# Patient Record
Sex: FEMALE | Race: WHITE | NOT HISPANIC OR LATINO | Employment: FULL TIME | URBAN - METROPOLITAN AREA
[De-identification: names, ages, dates, MRNs, and addresses within clinical notes are randomized per-mention and may not be internally consistent; named-entity substitution may affect disease eponyms.]

---

## 2023-07-21 ENCOUNTER — OFFICE VISIT (OUTPATIENT)
Dept: PRIMARY CARE | Facility: CLINIC | Age: 68
End: 2023-07-21
Payer: COMMERCIAL

## 2023-07-21 VITALS
HEART RATE: 80 BPM | TEMPERATURE: 97.6 F | OXYGEN SATURATION: 95 % | SYSTOLIC BLOOD PRESSURE: 116 MMHG | BODY MASS INDEX: 39.49 KG/M2 | HEIGHT: 65 IN | DIASTOLIC BLOOD PRESSURE: 62 MMHG | WEIGHT: 237 LBS | RESPIRATION RATE: 18 BRPM
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
## 2023-07-21 DIAGNOSIS — S70.02XA CONTUSION OF LEFT HIP, INITIAL ENCOUNTER: Primary | ICD-10-CM

## 2023-07-21 DIAGNOSIS — S60.212A CONTUSION OF LEFT WRIST, INITIAL ENCOUNTER: ICD-10-CM

## 2023-07-21 PROCEDURE — 99213 OFFICE O/P EST LOW 20 MIN: CPT

## 2023-07-21 RX ORDER — LORATADINE 10 MG/1
10 TABLET ORAL
COMMUNITY
Start: 2022-04-04

## 2023-07-21 RX ORDER — IBUPROFEN 100 MG/5ML
1 SUSPENSION, ORAL (FINAL DOSE FORM) ORAL
COMMUNITY

## 2023-07-21 RX ORDER — ACETAMINOPHEN 500 MG
2000 TABLET ORAL
Qty: 30 CAPSULE | Refills: 11 | COMMUNITY
Start: 2023-03-10 | End: 2024-03-09

## 2023-07-21 RX ORDER — CHOLECALCIFEROL (VITAMIN D3) 125 MCG
3000 CAPSULE ORAL
COMMUNITY
Start: 2014-09-23

## 2023-07-21 RX ORDER — LANOLIN ALCOHOL/MO/W.PET/CERES
1000 CREAM (GRAM) TOPICAL DAILY
COMMUNITY
Start: 2023-07-06

## 2023-07-21 RX ORDER — LEVOTHYROXINE SODIUM 175 UG/1
1 TABLET ORAL DAILY
COMMUNITY
Start: 2012-08-02

## 2023-07-21 RX ORDER — MELOXICAM 15 MG/1
1 TABLET ORAL DAILY
COMMUNITY
Start: 2012-04-09

## 2023-07-21 RX ORDER — ATORVASTATIN CALCIUM 40 MG/1
1 TABLET, FILM COATED ORAL
COMMUNITY
Start: 2022-11-17

## 2023-07-21 ASSESSMENT — ENCOUNTER SYMPTOMS
MYALGIAS: 1
BACK PAIN: 0
SHORTNESS OF BREATH: 0
DIZZINESS: 0
NECK STIFFNESS: 0
NAUSEA: 0
JOINT SWELLING: 0
ARTHRALGIAS: 1
FEVER: 0
LIGHT-HEADEDNESS: 0
VOMITING: 0
NECK PAIN: 0

## 2023-07-21 NOTE — PROGRESS NOTES
I reviewed with the resident the medical history and the resident’s findings on physical examination.  I discussed with the resident the patient’s diagnosis and concur with the treatment plan as documented in the resident note.     Tyler Kumar MD

## 2023-07-21 NOTE — PROGRESS NOTES
"Subjective   Yohana Miguel is a 68 y.o. female who is here for [facility] visit after a fall at work on 23. She was bending over to put something away in a cooler and when she stood up and turned around she \"tripped over her feet and fell\", landing on her left outstretched hand and left hip. Denies hitting head or losing consciousness. Went to ED following fall for workup. ED workup included Xrays of hip and wrist/hand which were negative, she was sent home with motrin and tylenol. No head imaging or lab-work performed.  Today her pain is mostly resolved and is being well controlled on meloxicam and Tylenol.  She does have some bruising on her left hip, however her gait has not been affected by the injury.  Her wrist and hand are feeling much better and she is able to perform daily tasks and work duties.    She works at [employer] in the [facility] and part of her job includes unloading the truck, preparing food, and working cash register.     Comprehensive Medical/Surgical/Social/Family History  Past Medical History:   Diagnosis Date    Other conditions influencing health status     Congenital Malformations    Personal history of other diseases of the circulatory system     History of varicose veins    Personal history of other diseases of the nervous system and sense organs     History of sleep apnea    Personal history of other endocrine, nutritional and metabolic disease     History of obesity    Personal history of other endocrine, nutritional and metabolic disease     History of hypothyroidism    Personal history of other endocrine, nutritional and metabolic disease     History of hyperlipidemia    Plantar fascial fibromatosis     Plantar fasciitis    Vitamin D deficiency, unspecified     Vitamin D deficiency     Past Surgical History:   Procedure Laterality Date    CARPAL TUNNEL RELEASE  2012    Neuroplasty Decompression Median Nerve At Carpal Tunnel     SECTION, CLASSIC  2012    " " Section    GALLBLADDER SURGERY  2012    Gallbladder Surgery     Social History     Social History Narrative    Not on file         Allergies and Medications  Amoxicillin-pot clavulanate and Erythromycin  Current Outpatient Medications on File Prior to Visit   Medication Sig Dispense Refill    ascorbic acid (Vitamin C) 1,000 mg tablet Take 1 tablet (1,000 mg) by mouth once daily.      atorvastatin (Lipitor) 40 mg tablet Take 1 tablet (40 mg) by mouth once daily in the evening. Take with meals.      cholecalciferol (Vitamin D-3) 50 mcg (2,000 unit) capsule Take 1 capsule (2,000 Units) by mouth once daily. 30 capsule 11    cyanocobalamin (Vitamin B-12) 1,000 mcg tablet Take 1 tablet (1,000 mcg) by mouth once daily.      lactase (Lactaid) 3,000 unit tablet Take 1 tablet (3,000 Units) by mouth.      levothyroxine (Synthroid, Levoxyl) 175 mcg tablet Take 1 tablet (175 mcg) by mouth once daily.      loratadine (Claritin) 10 mg tablet Take 1 tablet (10 mg) by mouth once daily.      meloxicam (Mobic) 15 mg tablet Take 1 tablet (15 mg) by mouth once daily.       No current facility-administered medications on file prior to visit.       Review of Systems   Constitutional:  Negative for fever.   Respiratory:  Negative for shortness of breath.    Cardiovascular:  Negative for chest pain.   Gastrointestinal:  Negative for nausea and vomiting.   Musculoskeletal:  Positive for arthralgias and myalgias. Negative for back pain, gait problem, joint swelling, neck pain and neck stiffness.   Neurological:  Negative for dizziness and light-headedness.   All other systems reviewed and are negative.      Objective   /62 (BP Location: Right arm, Patient Position: Sitting)   Pulse 80   Temp 36.4 °C (97.6 °F)   Resp 18   Ht 1.651 m (5' 5\")   Wt 108 kg (237 lb)   SpO2 95%   BMI 39.44 kg/m²     Physical Exam  Vitals reviewed.   Constitutional:       General: She is not in acute distress.     Appearance: Normal " appearance. She is not toxic-appearing.   HENT:      Head: Normocephalic and atraumatic.      Nose: Nose normal.   Eyes:      Extraocular Movements: Extraocular movements intact.   Cardiovascular:      Rate and Rhythm: Normal rate and regular rhythm.      Heart sounds: No murmur heard.     No friction rub. No gallop.   Pulmonary:      Effort: Pulmonary effort is normal. No respiratory distress.      Breath sounds: Normal breath sounds. No wheezing, rhonchi or rales.   Musculoskeletal:         General: Swelling and tenderness present. Normal range of motion.      Right upper leg: Normal.      Left upper leg: Swelling and tenderness present.      Comments: Left hip tenderness, bruising, swelling in the area where patient landed during fall.  Good range of motion laterally, normal strength, normal sensation, no bony deformities.   Skin:     General: Skin is warm and dry.   Neurological:      General: No focal deficit present.      Mental Status: She is alert.   Psychiatric:         Mood and Affect: Mood normal.         Behavior: Behavior normal.         Assessment/Plan 68-year-old female presenting for [facility] visit following fall at work when she landed on her left hip and hand/wrist.  She is doing much better since the initial fall and pain is being well controlled with meloxicam 15 mg daily as well as Tylenol as needed.  X-rays from ED visit on 7/17/2023 revealed negative x-rays of the wrist and left hip.  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  She is more appropriate to have reduced duties for the next 2 weeks which were outlined in [facility] paperwork.  Referral to physical therapy also provided to help strengthen and provide balance training to prevent further falls.  Problem List Items Addressed This Visit       Contusion of left wrist    Relevant Orders    Referral to Physical Therapy    Contusion of left hip - Primary    Relevant Orders    Referral to  Physical Therapy    Fall    Relevant Orders    Referral to Physical Therapy

## 2023-08-03 ENCOUNTER — OFFICE VISIT (OUTPATIENT)
Dept: PRIMARY CARE | Facility: CLINIC | Age: 68
End: 2023-08-03
Payer: COMMERCIAL

## 2023-08-03 VITALS
HEART RATE: 78 BPM | OXYGEN SATURATION: 96 % | SYSTOLIC BLOOD PRESSURE: 128 MMHG | BODY MASS INDEX: 38.94 KG/M2 | DIASTOLIC BLOOD PRESSURE: 75 MMHG | RESPIRATION RATE: 18 BRPM | TEMPERATURE: 97.2 F | WEIGHT: 234 LBS

## 2023-08-03 DIAGNOSIS — S60.212D CONTUSION OF LEFT WRIST, SUBSEQUENT ENCOUNTER: ICD-10-CM
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
## 2023-08-03 DIAGNOSIS — S70.02XD CONTUSION OF LEFT HIP, SUBSEQUENT ENCOUNTER: Primary | ICD-10-CM

## 2023-08-03 PROCEDURE — 99213 OFFICE O/P EST LOW 20 MIN: CPT

## 2023-08-03 NOTE — PROGRESS NOTES
I reviewed with the resident the medical history and the resident’s findings on physical examination.  I discussed with the resident the patient’s diagnosis and concur with the treatment plan as documented in the resident note.     No Mahajan MD

## 2023-08-03 NOTE — PROGRESS NOTES
Subjective   Yohana Miguel is a 68 y.o. female who is here for follow up on left hip contusion. She is doing much better since the initial fall and pain is being well controlled with meloxicam 15 mg daily as well as Tylenol as needed. Prior X-rays from ED visit on 2023 revealed negative x-rays of the wrist and left hip. She was referred to PT and instructed to limit lifting heavy items at work. Pt will start PT 23 and has been lifting occasionally on the job. She said that her bruises and pain have gotten better. She has been doing [employer] for exercise.     Review of Systems  MSK: Negative for myalgia, arthralgia, and joint swelling.    Comprehensive Medical/Surgical/Social/Family History  Past Medical History:   Diagnosis Date    Other conditions influencing health status     Congenital Malformations    Personal history of other diseases of the circulatory system     History of varicose veins    Personal history of other diseases of the nervous system and sense organs     History of sleep apnea    Personal history of other endocrine, nutritional and metabolic disease     History of obesity    Personal history of other endocrine, nutritional and metabolic disease     History of hypothyroidism    Personal history of other endocrine, nutritional and metabolic disease     History of hyperlipidemia    Plantar fascial fibromatosis     Plantar fasciitis    Vitamin D deficiency, unspecified     Vitamin D deficiency     Past Surgical History:   Procedure Laterality Date    CARPAL TUNNEL RELEASE  2012    Neuroplasty Decompression Median Nerve At Carpal Tunnel     SECTION, CLASSIC  2012     Section    GALLBLADDER SURGERY  2012    Gallbladder Surgery     Social History     Social History Narrative    Not on file         Allergies and Medications  Amoxicillin-pot clavulanate and Erythromycin  Current Outpatient Medications on File Prior to Visit   Medication Sig Dispense Refill     ascorbic acid (Vitamin C) 1,000 mg tablet Take 1 tablet (1,000 mg) by mouth once daily.      atorvastatin (Lipitor) 40 mg tablet Take 1 tablet (40 mg) by mouth once daily in the evening. Take with meals.      cholecalciferol (Vitamin D-3) 50 mcg (2,000 unit) capsule Take 1 capsule (2,000 Units) by mouth once daily. 30 capsule 11    cyanocobalamin (Vitamin B-12) 1,000 mcg tablet Take 1 tablet (1,000 mcg) by mouth once daily.      lactase (Lactaid) 3,000 unit tablet Take 1 tablet (3,000 Units) by mouth.      levothyroxine (Synthroid, Levoxyl) 175 mcg tablet Take 1 tablet (175 mcg) by mouth once daily.      loratadine (Claritin) 10 mg tablet Take 1 tablet (10 mg) by mouth once daily.      meloxicam (Mobic) 15 mg tablet Take 1 tablet (15 mg) by mouth once daily.       No current facility-administered medications on file prior to visit.       New concerns:  ***          Objective   There were no vitals taken for this visit.    Physical Exam  Constitutional:       Appearance: Normal appearance. She is obese.   Cardiovascular:      Rate and Rhythm: Normal rate and regular rhythm.   Pulmonary:      Breath sounds: Normal breath sounds.   Musculoskeletal:         General: No swelling.   No bruising noted over left hip. Abnormal gait related to baseline attributable to lack of conditioning and physical fitness.     Assessment/Plan   68-year-old female presenting for [facility] visit following fall at work when she landed on her left hip and hand/wrist. BWC was completed, noting that although the pt is at physical baseline prior to injury, her condition is unfit for lifting heavy loads and that she is appropriate for less physically-demanding tasks. Patient was advised to continue to limit lifting heavy boxes to prevent fall recurrence until condition improves after PT.   Problem List Items Addressed This Visit    None

## 2023-08-03 NOTE — PROGRESS NOTES
Subjective   Yohana Miguel is a 68 y.o. female who is here for [facility] follow up on left hip contusion fall at work on 2023. She is doing much better since the initial fall and pain is being well controlled with meloxicam 15 mg daily as well as Tylenol as needed. Prior X-rays from ED visit on 2023 revealed negative x-rays of the wrist and left hip. She was referred to PT and instructed to limit lifting heavy items at work. Pt will start PT 23. She said that her bruises and pain have gotten better. She has been doing [employer] for exercise.  Patient feels like she has returned to her baseline.    Review of Systems  MSK: Negative for myalgia, arthralgia, and joint swelling.    Comprehensive Medical/Surgical/Social/Family History  Past Medical History:   Diagnosis Date    Other conditions influencing health status     Congenital Malformations    Personal history of other diseases of the circulatory system     History of varicose veins    Personal history of other diseases of the nervous system and sense organs     History of sleep apnea    Personal history of other endocrine, nutritional and metabolic disease     History of obesity    Personal history of other endocrine, nutritional and metabolic disease     History of hypothyroidism    Personal history of other endocrine, nutritional and metabolic disease     History of hyperlipidemia    Plantar fascial fibromatosis     Plantar fasciitis    Vitamin D deficiency, unspecified     Vitamin D deficiency     Past Surgical History:   Procedure Laterality Date    CARPAL TUNNEL RELEASE  2012    Neuroplasty Decompression Median Nerve At Carpal Tunnel     SECTION, CLASSIC  2012     Section    GALLBLADDER SURGERY  2012    Gallbladder Surgery     Social History     Social History Narrative    Not on file         Allergies and Medications  Amoxicillin-pot clavulanate and Erythromycin  Current Outpatient Medications on File Prior to  Visit   Medication Sig Dispense Refill    ascorbic acid (Vitamin C) 1,000 mg tablet Take 1 tablet (1,000 mg) by mouth once daily.      atorvastatin (Lipitor) 40 mg tablet Take 1 tablet (40 mg) by mouth once daily in the evening. Take with meals.      cholecalciferol (Vitamin D-3) 50 mcg (2,000 unit) capsule Take 1 capsule (2,000 Units) by mouth once daily. 30 capsule 11    cyanocobalamin (Vitamin B-12) 1,000 mcg tablet Take 1 tablet (1,000 mcg) by mouth once daily.      lactase (Lactaid) 3,000 unit tablet Take 1 tablet (3,000 Units) by mouth.      levothyroxine (Synthroid, Levoxyl) 175 mcg tablet Take 1 tablet (175 mcg) by mouth once daily.      loratadine (Claritin) 10 mg tablet Take 1 tablet (10 mg) by mouth once daily.      meloxicam (Mobic) 15 mg tablet Take 1 tablet (15 mg) by mouth once daily.       No current facility-administered medications on file prior to visit.       Objective   /75 (BP Location: Right arm, Patient Position: Sitting)   Pulse 78   Temp 36.2 °C (97.2 °F)   Resp 18   Wt 106 kg (234 lb)   SpO2 96%   BMI 38.94 kg/m²     Physical Exam  Constitutional:       Appearance: Normal appearance. She is obese.   HENT:      Head: Normocephalic and atraumatic.      Mouth/Throat:      Mouth: Mucous membranes are moist.   Eyes:      General: No scleral icterus.     Extraocular Movements: Extraocular movements intact.      Conjunctiva/sclera: Conjunctivae normal.   Cardiovascular:      Rate and Rhythm: Normal rate and regular rhythm.      Heart sounds: Normal heart sounds.   Pulmonary:      Effort: Pulmonary effort is normal. No respiratory distress.      Breath sounds: Normal breath sounds. No wheezing, rhonchi or rales.   Musculoskeletal:         General: No swelling, tenderness or deformity.      Comments: Range of motion and strength at baseline reduced due to overall poor conditioning/fitness   Skin:     General: Skin is warm and dry.      Findings: No bruising or lesion.          Assessment/Plan   68-year-old female presenting for BWC visit following fall at work when she landed on her left hip and hand/wrist. BWC was completed, noting that although the pt is at physical baseline prior to injury, her condition is unfit for lifting heavy loads and that she is appropriate for less physically-demanding tasks. Patient was advised to continue to limit lifting heavy boxes to prevent fall recurrence focus on fall prevention in general by continuing physical therapy and home exercises.  Problem List Items Addressed This Visit       Contusion of left wrist    Contusion of left hip - Primary    Fall

## 2023-10-06 ENCOUNTER — TELEPHONE (OUTPATIENT)
Dept: PRIMARY CARE | Facility: CLINIC | Age: 68
End: 2023-10-06
Payer: MEDICARE

## 2024-02-06 ENCOUNTER — OFFICE VISIT (OUTPATIENT)
Dept: PRIMARY CARE | Facility: CLINIC | Age: 69
End: 2024-02-06
Payer: COMMERCIAL

## 2024-02-06 VITALS
SYSTOLIC BLOOD PRESSURE: 139 MMHG | OXYGEN SATURATION: 95 % | BODY MASS INDEX: 39.65 KG/M2 | WEIGHT: 238.25 LBS | DIASTOLIC BLOOD PRESSURE: 70 MMHG | TEMPERATURE: 97.9 F | RESPIRATION RATE: 16 BRPM | HEART RATE: 71 BPM

## 2024-02-06 DIAGNOSIS — M25.532 LEFT WRIST PAIN: ICD-10-CM

## 2024-02-06 DIAGNOSIS — S80.01XA CONTUSION OF RIGHT KNEE, INITIAL ENCOUNTER: Primary | ICD-10-CM

## 2024-02-06 PROBLEM — M19.90 OSTEOARTHROSIS: Status: ACTIVE | Noted: 2024-02-06

## 2024-02-06 PROCEDURE — 99213 OFFICE O/P EST LOW 20 MIN: CPT

## 2024-02-06 ASSESSMENT — ENCOUNTER SYMPTOMS
LIGHT-HEADEDNESS: 0
SHORTNESS OF BREATH: 0
DIZZINESS: 0
NAUSEA: 0
JOINT SWELLING: 1
VOMITING: 0
FEVER: 0

## 2024-02-06 NOTE — PROGRESS NOTES
I saw and evaluated the patient. I personally obtained the key and critical portions of the history and physical exam or was physically present for key and critical portions performed by the resident/fellow. I reviewed the resident/fellow's documentation and discussed the patient with the resident/fellow. I agree with the resident/fellow's medical decision making as documented in the note.    Elijah Ribera, DO

## 2024-02-06 NOTE — PROGRESS NOTES
"Subjective   HPI  Yohana Miguel is a 68 y.o. female who is here for [facility] appointment. 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 She had immediate pain and was seen at [facility] ED that day. At that time she says that it was difficult to ambulate. XR wrist - Left wrist soft tissue swelling without fracture or dislocation. XR R knee - Right knee effusion without fracture or dislocation. Moderate degenerative change.  She was discharged with recommendations to do RICE therapy and take NSAIDs as needed.    At present patient says that she has been at work and has been on \"light duty\".  She states that she still has some pain and swelling in her right knee but she is able to ambulate and has not felt like her knee was catching, clicking, popping, giving out on her.  She has not had any further falls or trips.  Her left wrist feels fine and is in no pain and has no decreased motion, swelling, bruising    Review of Systems   Constitutional:  Negative for fever.   Respiratory:  Negative for shortness of breath.    Cardiovascular:  Negative for chest pain.   Gastrointestinal:  Negative for nausea and vomiting.   Musculoskeletal:  Positive for joint swelling (Right knee swelling).   Neurological:  Negative for dizziness and light-headedness.   All other systems reviewed and are negative.      Objective   /70 (BP Location: Right arm, Patient Position: Sitting)   Pulse 71   Temp 36.6 °C (97.9 °F) (Temporal)   Resp 16   Wt 108 kg (238 lb 4 oz)   SpO2 95%   BMI 39.65 kg/m²     Physical Exam  Vitals reviewed.   Constitutional:       General: She is not in acute distress.     Appearance: Normal appearance. She is not toxic-appearing.   HENT:      Head: Normocephalic and atraumatic.      Nose: Nose normal.   Eyes:      Extraocular Movements: Extraocular movements intact.   Cardiovascular:      Rate and Rhythm: Normal rate and regular rhythm. "      Heart sounds: No murmur heard.     No friction rub. No gallop.   Pulmonary:      Effort: Pulmonary effort is normal. No respiratory distress.      Breath sounds: Normal breath sounds. No wheezing, rhonchi or rales.   Musculoskeletal:         General: Swelling present.      Right knee: Swelling, effusion and erythema present. No ecchymosis or bony tenderness. Normal range of motion. Tenderness present over the medial joint line. No lateral joint line, MCL, LCL, ACL, PCL or patellar tendon tenderness. No LCL laxity, MCL laxity, ACL laxity or PCL laxity. Normal alignment, normal meniscus and normal patellar mobility.      Left knee: Normal.   Skin:     General: Skin is warm and dry.   Neurological:      General: No focal deficit present.      Mental Status: She is alert.   Psychiatric:         Mood and Affect: Mood normal.         Behavior: Behavior normal.              Assessment/Plan presenting for [facility] complaint after sustaining a fall at work on 1/29.  She was seen at [place] ED following this incident and had x-rays that revealed soft tissue swelling of the right knee but no acute fractures of the knee or wrist.  On examination today she has some swelling and tenderness along the medial aspect of her right knee.  Good range of motion, special testing negative no concern for ligamentous injury or meniscal injury at this time.  Patient states that she is ready to fully return to work.  We discussed that this acute timeframe she may be at increased risk for another accident due to the pain in her knee and she was agreeable to light duty for the next 2 weeks.  Would recommend avoiding any prolonged standing or walking.  Avoid tibias such as mopping the floor, walking on wet surfaces, unloading heavy items.  Follow-up in 2 weeks for reassessment and discussion of return to full work duties.  Problem List Items Addressed This Visit    None  Visit Diagnoses       Contusion of right knee, initial encounter    -   Primary    Left wrist pain

## 2024-02-21 ENCOUNTER — APPOINTMENT (OUTPATIENT)
Dept: PRIMARY CARE | Facility: CLINIC | Age: 69
End: 2024-02-21
Payer: COMMERCIAL

## 2024-02-23 ENCOUNTER — OFFICE VISIT (OUTPATIENT)
Dept: PRIMARY CARE | Facility: CLINIC | Age: 69
End: 2024-02-23
Payer: COMMERCIAL

## 2024-02-23 VITALS
OXYGEN SATURATION: 97 % | RESPIRATION RATE: 16 BRPM | TEMPERATURE: 98 F | HEART RATE: 72 BPM | BODY MASS INDEX: 39.15 KG/M2 | DIASTOLIC BLOOD PRESSURE: 80 MMHG | SYSTOLIC BLOOD PRESSURE: 138 MMHG | WEIGHT: 235.25 LBS

## 2024-02-23 DIAGNOSIS — S80.01XD CONTUSION OF RIGHT KNEE, SUBSEQUENT ENCOUNTER: Primary | ICD-10-CM

## 2024-02-23 DIAGNOSIS — S60.212D CONTUSION OF LEFT WRIST, SUBSEQUENT ENCOUNTER: ICD-10-CM

## 2024-02-23 PROBLEM — S60.212A CONTUSION OF LEFT WRIST: Status: RESOLVED | Noted: 2023-07-21 | Resolved: 2024-02-23

## 2024-02-23 PROBLEM — S70.02XA CONTUSION OF LEFT HIP: Status: RESOLVED | Noted: 2023-07-21 | Resolved: 2024-02-23

## 2024-02-23 PROCEDURE — 99213 OFFICE O/P EST LOW 20 MIN: CPT

## 2024-02-23 ASSESSMENT — ENCOUNTER SYMPTOMS
VOMITING: 0
DIZZINESS: 0
LIGHT-HEADEDNESS: 0
NAUSEA: 0
SHORTNESS OF BREATH: 0
FEVER: 0

## 2024-02-23 NOTE — PROGRESS NOTES
Subjective   HPI  Yohana Miguel is a 68 y.o. female who is here for 2 week [facility] follow up from injury that occurred on January 29th 2024. She was last seen on 2/6/24 and at that time was having swelling, bruising, and pain in her right knee.  We discussed that she should be on light duty for the next 2 weeks and follow-up today.  As of today her right knee is much less swollen and almost back to normal, the bruising has resolved, and she feels like the pain has almost gone away completely.  She still takes Tylenol couple times a week but has been doing so for her pre-existing arthritis.  Denies any sensation that the knee was giving out, further falls, difficulty pivoting or ambulating.    Review of Systems   Constitutional:  Negative for fever.   Respiratory:  Negative for shortness of breath.    Cardiovascular:  Negative for chest pain.   Gastrointestinal:  Negative for nausea and vomiting.   Neurological:  Negative for dizziness and light-headedness.   All other systems reviewed and are negative.      Objective   /80 (BP Location: Right arm, Patient Position: Sitting)   Pulse 72   Temp 36.7 °C (98 °F) (Temporal)   Resp 16   Wt 107 kg (235 lb 4 oz)   SpO2 97%   BMI 39.15 kg/m²     Physical Exam  Vitals reviewed.   Constitutional:       General: She is not in acute distress.     Appearance: Normal appearance. She is not toxic-appearing.   HENT:      Head: Normocephalic and atraumatic.   Eyes:      Extraocular Movements: Extraocular movements intact.   Cardiovascular:      Rate and Rhythm: Normal rate.   Pulmonary:      Effort: Pulmonary effort is normal.   Musculoskeletal:         General: Swelling (Minimal swelling right knee, much improved from previous exam) present. No tenderness.      Right lower leg: No edema.      Left lower leg: No edema.   Skin:     General: Skin is warm and dry.   Neurological:      General: No focal deficit present.      Mental Status: She is alert.   Psychiatric:          Mood and Affect: Mood normal.         Behavior: Behavior normal.         Assessment/Plan 68-year-old female presenting for [facility] Worker's [facility]. follow-up after a fall at work on 1/29/2024.  She was seen in 2/6/2024 with x-rays that revealed contusion of right knee and left wrist.  Since then her pain and swelling has greatly improved and she feels ready to be released to full duties at work.  On examination her knee appears much less swollen and she has good range of motion and no tenderness to palpation.  Her left wrist is also causing no pain or discomfort at this time.  Recommend that she continue to be careful lifting and moving objects at work and avoid slippery floors to avoid any future falls.  Problem List Items Addressed This Visit    None  Visit Diagnoses       Contusion of right knee, subsequent encounter    -  Primary    Contusion of left wrist, subsequent encounter

## 2024-10-30 ENCOUNTER — APPOINTMENT (OUTPATIENT)
Dept: DERMATOLOGY | Facility: CLINIC | Age: 69
End: 2024-10-30
Payer: MEDICARE

## 2024-10-30 DIAGNOSIS — L72.0 EPIDERMAL CYST: Primary | ICD-10-CM

## 2024-10-30 DIAGNOSIS — R20.8 OTHER DISTURBANCES OF SKIN SENSATION: ICD-10-CM

## 2024-10-30 PROCEDURE — 1159F MED LIST DOCD IN RCRD: CPT | Performed by: STUDENT IN AN ORGANIZED HEALTH CARE EDUCATION/TRAINING PROGRAM

## 2024-10-30 PROCEDURE — 99213 OFFICE O/P EST LOW 20 MIN: CPT | Performed by: STUDENT IN AN ORGANIZED HEALTH CARE EDUCATION/TRAINING PROGRAM

## 2024-10-30 RX ORDER — LANCETS 30 GAUGE
EACH MISCELLANEOUS
COMMUNITY
Start: 2024-07-31

## 2024-10-30 RX ORDER — CHOLECALCIFEROL (VITAMIN D3) 25 MCG
1000 TABLET ORAL DAILY
COMMUNITY

## 2024-10-30 RX ORDER — BLOOD SUGAR DIAGNOSTIC
STRIP MISCELLANEOUS
COMMUNITY
Start: 2024-10-08

## 2024-10-30 RX ORDER — TIRZEPATIDE 2.5 MG/.5ML
2.5 INJECTION, SOLUTION SUBCUTANEOUS
COMMUNITY
Start: 2024-10-21 | End: 2025-01-19

## 2024-10-30 RX ORDER — ONDANSETRON 4 MG/1
4 TABLET, FILM COATED ORAL AS NEEDED
COMMUNITY
Start: 2024-10-21

## 2024-10-30 RX ORDER — DICYCLOMINE HYDROCHLORIDE 10 MG/1
100 CAPSULE ORAL 3 TIMES DAILY PRN
COMMUNITY
Start: 2024-10-21

## 2024-10-30 RX ORDER — SEMAGLUTIDE 0.68 MG/ML
INJECTION, SOLUTION SUBCUTANEOUS
COMMUNITY
Start: 2024-10-15

## 2024-12-10 ENCOUNTER — APPOINTMENT (OUTPATIENT)
Dept: DERMATOLOGY | Facility: CLINIC | Age: 69
End: 2024-12-10
Payer: MEDICARE

## 2025-03-05 ENCOUNTER — APPOINTMENT (OUTPATIENT)
Dept: DERMATOLOGY | Facility: CLINIC | Age: 70
End: 2025-03-05
Payer: MEDICARE

## 2025-03-05 VITALS — DIASTOLIC BLOOD PRESSURE: 72 MMHG | HEART RATE: 74 BPM | SYSTOLIC BLOOD PRESSURE: 111 MMHG

## 2025-03-05 DIAGNOSIS — D48.5 NEOPLASM OF UNCERTAIN BEHAVIOR OF SKIN: Primary | ICD-10-CM

## 2025-03-05 PROCEDURE — 99212 OFFICE O/P EST SF 10 MIN: CPT | Performed by: DERMATOLOGY

## 2025-03-05 NOTE — PROGRESS NOTES
"Excision Operative Note    Date of Surgery:  3/5/2025  Surgeon:  Genaro Hernandez MD PhD  Office Location: [address] [address]  Dept: [phone]  Dept Fax: [phone]  Referring Provider: William Fry MD  [address] [facility]  [address], [address] [place] [address]    Subjective   Yohana Miguel \"Bernice\" is a 69 y.o. female who presents for the following: Excision (Neck-Posterior, Cyst) for neoplasm of uncertain behavior of skin.    According to the patient, the lesion has been present for approximately greater than 1 year at the time of diagnosis.  The lesion is not causing symptoms.  The lesion has not been treated previously.    The patient does not have a pacemaker / defibrillator.  The patient does have a heart valve / joint replacement.  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   The patient does not have a history of hepatitis B or C.  The patient does not have a history of HIV.  The patient does not have a history of immunosuppression (e.g. organ transplantation, malignancy, medications)    Is it okay to leave a phone message with results? Yes    Assessment/Plan   Discussed the risks, benefits, alternatives, and wound care for the procedure. All questions were answered.    The cyst has resolved on its own; surgery canceled today. Patient educated to call the office if the cyst returns.     Genaro Hernandez MD, PhD    "